# Patient Record
Sex: MALE | Race: WHITE
[De-identification: names, ages, dates, MRNs, and addresses within clinical notes are randomized per-mention and may not be internally consistent; named-entity substitution may affect disease eponyms.]

---

## 2018-07-25 ENCOUNTER — HOSPITAL ENCOUNTER (OUTPATIENT)
Dept: HOSPITAL 25 - CHICATH | Age: 68
Discharge: HOME | End: 2018-07-25
Attending: RADIOLOGY
Payer: MEDICARE

## 2018-07-25 VITALS — SYSTOLIC BLOOD PRESSURE: 167 MMHG | DIASTOLIC BLOOD PRESSURE: 77 MMHG

## 2018-07-25 DIAGNOSIS — I70.223: Primary | ICD-10-CM

## 2018-07-25 DIAGNOSIS — Z79.4: ICD-10-CM

## 2018-07-25 DIAGNOSIS — E66.9: ICD-10-CM

## 2018-07-25 DIAGNOSIS — I25.10: ICD-10-CM

## 2018-07-25 DIAGNOSIS — E11.9: ICD-10-CM

## 2018-07-25 DIAGNOSIS — Z72.0: ICD-10-CM

## 2018-07-25 DIAGNOSIS — K21.9: ICD-10-CM

## 2018-07-25 DIAGNOSIS — I25.2: ICD-10-CM

## 2018-07-25 PROCEDURE — C1894 INTRO/SHEATH, NON-LASER: HCPCS

## 2018-07-25 PROCEDURE — 75736 ARTERY X-RAYS PELVIS: CPT

## 2018-07-25 PROCEDURE — 76937 US GUIDE VASCULAR ACCESS: CPT

## 2018-07-25 PROCEDURE — C1769 GUIDE WIRE: HCPCS

## 2018-07-25 PROCEDURE — C1760 CLOSURE DEV, VASC: HCPCS

## 2018-07-25 PROCEDURE — 85347 COAGULATION TIME ACTIVATED: CPT

## 2018-07-25 PROCEDURE — C1887 CATHETER, GUIDING: HCPCS

## 2018-07-25 PROCEDURE — 99156 MOD SED OTH PHYS/QHP 5/>YRS: CPT

## 2018-07-25 PROCEDURE — 99157 MOD SED OTHER PHYS/QHP EA: CPT

## 2018-07-25 PROCEDURE — C1725 CATH, TRANSLUMIN NON-LASER: HCPCS

## 2018-07-25 NOTE — RAD
CPT II Codes: 



Procedure(s) performed:

1. Diagnostic arteriography of the pelvis and bilateral lower extremities.

2. Balloon angioplasty of the right common iliac artery, left internal iliac artery and

left superficial femoral artery.

3. Percutaneous closure with a minx closure device to the right common femoral

arteriotomy.



Date of service: July 25, 2018



Indication for procedure: Right worse than left hip and buttock claudication as well as

bilateral calf claudication



Comparison: CTA with runoff dated May 30, 2018



Contrast: 175 mL of Visipaque 320



Fluoroscopy Time: 12.7 minutes



Vessels Accessed: 

Percutaneous access was obtained with ultrasound guidance in the right common femoral

artery in the retrograde direction towards the heart. 

Catheter arteriography, with the catheter tip located within the lumen of the following

arteries, was performed at the right external iliac artery, aorta, left common iliac

artery, left common femoral artery and left superficial femoral artery.



Anesthesia: Conscious sedation with IV Fentanyl and Versed as well as local 1% lidocaine

injected locally at the arteriotomy site.



Conscious sedation time:

Timeout: 1224 hours

Case end: 1402 hours

Total conscious sedation time: 1 hour and 38 minutes



Additional medications:

*  IV heparin 5000 Units to achieve a goal ACT of 250-300.

*  The patient received 1 mg of p.o. Ativan prior to the onset of the procedure.



PROCEDURE NOTE AND INTRAPROCEDURAL IMAGING FINDINGS:



Immediately prior to the procedure the patient signed consent after thoroughly discussing

all risks, benefits and alternative therapies. The patient was positioned on the

fluoroscopy table in the supine position and the bilateral groins were shaved, prepped and

the patient was draped in

standard sterile fashion.



Using fluoroscopic imaging the location of the right common femoral head was marked

externally with a skin marker on the patient's groin. Utilizing sonographic guidance and

palpation, the right  common femoral artery was cannulated overlying the femoral head with

an 18-gauge needle. An ultrasound image was saved.



A 0.035" wire was slowly and smoothly advanced into the common femoral artery under

fluoroscopic imaging. No buckling of the wire was visualized to indicate dissection. With

the wire securing percutaneous arterial access, the needle was removed and a 5-Czech

SideArm access sheath was advanced under fluoroscopic control into the right external

iliac artery securing access.



Through the side arm of the access sheath arteriography of the right common femoral artery

demonstrated an appropriate puncture of the common femoral artery above the bifurcation

and below the inferior epigastric artery.



Although there is a prior CT angiogram with runoff, coarse calcification prevents more

reliable determination of the degree of stenoses identified. Due to the patient's clinical

presentation contrast catheter arteriography is necessary.



A wire is advanced into the aorta and a 5-Czech multiside hole flush catheter was

advanced to the level of the lower aorta. Power injector aortography was performed

demonstrating long segment narrowing of the proximal right common iliac artery. There is

apparent narrowing at the origin of the left internal iliac artery as well as narrowing of

the branches of the internal iliac arteries bilaterally. The iliac arteries are otherwise

adequately patent with flow seen into the proximal superficial femoral arteries

bilaterally.



Pressure transduction across the stenotic right common iliac artery was indicated. The

following pressures were simultaneously acquired (mm Hg):



                            SBP          DBP          Mean

Aorta:                  151            61               94  

REIA:                  133            61               88  



These pressures were confirmed with a "pulled back" technique comparing pressures acquired

in the multiside hole catheter at the lower aorta versus the right external iliac artery.

The following pressures were simultaneously acquired (mm Hg):



                            SBP          DBP          Mean

Aorta:                  164             68             104  

REIA:                  147             68              98  



The Bentson wire was reinserted and replaced with a C1 C5 Czech catheter. Utilizing the

Bentson wire and C1 catheter access across the iliac bifurcation was obtained and the wire

was advanced into the proximal left superficial femoral artery. The 5-Czech C1 catheter

was advanced to the level of the left common femoral artery.



Contrast arteriography was performed demonstrating patency of the left common femoral

artery and patency of the femoral profundus. There is mild stenosis at the proximal left

superficial femoral artery. Arteriography was performed of the remaining left superficial

femoral artery and popliteal artery demonstrating mild stenoses at the mid-level

superficial femoral artery and adequate patency of the left popliteal artery.



A hydrophilic stiff 260 cm length wire was advanced into the infrapopliteal arteries and

over the wire a 4-Czech hydrophilic catheter was advanced until the tip was in the distal

left superficial femoral artery. Contrast arteriography was performed of the remaining

popliteal artery and infrapopliteal arteries. This demonstrates a high branch point of the

left anterior tibial artery at the level of the knee joint. There is adequate patency of

the infrapopliteal arteries proximally. Arteriography as far as the forefoot shows

two-vessel in-line runoff provided by the RADHA and PTA with communication of the "pedal

loop".



With the long hydrophilic wire still securing access, the catheter was removed and an 8 mm

x 60 mm Biotronik Passeo-35 balloon was advanced to the stenotic right common iliac artery

and balloon angioplasty was performed. The balloon was inflated to just below burst

pressure corresponding to an approximate diameter measurement of 8.3 mm. The balloon

remained inflated for minimum of 3 minutes to address spasm.



Contrast arteriography through the side arm of the right common femoral artery access

sheath showed no acute dissection.



Injecting the short access sheath with the tip in the right external iliac artery

arteriogram was performed of the right superficial femoral and popliteal arteries

demonstrating mild stenoses at the mid-level right superficial femoral artery.



The short access sheath was removed over the wire and a 45 cm length, 7-Czech access

sheath was advanced over the wire. The tip was advanced to just above the junction of the

right internal/external iliac arteries to better evaluate the recently ballooned

angioplastied right common iliac artery. Contrast arteriography shows improved patency and

brisk flow through the right common iliac artery. The stiffener was reinserted and the

access sheath was advanced until the tip was in the left common femoral artery. Repeat

arteriography from this point shows multifocal stenoses in the left superficial femoral

artery.



Over the wire a 5 mm x 100 mm EverCross balloon was advanced and balloon angioplasty was

performed at the foci of stenoses in the left superficial femoral artery. During each

inflation the balloon was inflated to just below burst pressure corresponding to a

diameter measurement of 5.3 mm. The balloon remained inflated for minimum of 3 minutes to

address spasm.



Contrast arteriogram through the sheath with the tip in the left common femoral artery

showed improved patency through the left superficial femoral artery.



Over the wire the access sheath was drawn back until the tip was in the left common

femoral artery. Contrast arteriogram was performed to better characterize the stenosis at

the left internal iliac artery. There is coarse calcification causing a moderate degree of

stenosis. Utilizing the hydrophilic wire the left internal iliac artery was selected and

the 5 mm x 100 mm EverCross balloon was advanced across the stenotic ostium of the left

internal iliac artery and balloon angioplasty was performed. The balloon was inflated just

below burst pressure corresponding to a diameter measurement of 5.3 mm and remain inflated

for minimum of 3 minutes.



Contrast arteriogram after the balloon was deflated and removed demonstrated no dissection

or extravasation.



Over the wire the access sheath was exchanged the 7-Czech, 11 cm length access sheath

intended specifically for percutaneous arterial closure.



Through the side arm of the access sheath arteriography of the right common femoral artery

demonstrated an appropriate puncture of the common femoral artery above the bifurcation

and below the inferior epigastric artery.



After an appropriate resterilization of the arteriotomy and exchange for new sterile

gloves, a Minx closure device was deployed at the common femoral arteriotomy and pressure

held for approximately 15 minutes.



There were no signs of bleeding at the percutaneous arterial access site and the site was

dressed with sterile gauze and Tegaderm.



The patient tolerated the procedure well and was transferred to angiography holding bay

for standard post procedural observation.



SUMMARY OF PROCEDURE, IMAGING FINDINGS AND INTERVENTIONS PERFORMED:



1. Diagnostic studies performed:

*  Arterial access was obtained at the right common femoral artery  in the retrograde

direction (i.e. towards the heart) with ultrasound guidance. A sonographic image was

recorded.

*  Diagnostic catheter angiography was necessary as calcified atherosclerosis limited the

diagnostic utility on the previously acquired CT angiogram.

*  Diagnostic catheter angiography was performed with the catheter tip in the right

external iliac artery, aorta, left common iliac artery, left common femoral artery and

left superficial femoral artery.

*  Catheter arteriography was performed of the lower abdominal aorta and entire bilateral

iliac arterial circuit, the entire left lower extremity arterial circuit as far as the

forefoot and the left lower extremity arterial circuit including the femoral profundus,

superficial femoral artery and popliteal artery.

*  At the conclusion of the procedure arteriography was performed through the side arm of

the access sheath to image the distal right external iliac artery, right common femoral

artery and proximal superficial femoral artery and femoral profundus.



2. Interpretation of diagnostic studies performed:

*  Moderate stenosis at the right common iliac artery with a borderline mean arterial

gradient of 6 mm Hg above and below the stenosis.

*  Multifocal stenoses in the bilateral superficial femoral arteries.

*  Mild to moderate stenosis at the origin of the left internal iliac artery.

*  Arteriography performed for the purpose of deploying a percutaneous arterial closure

device demonstrates adequately patent right external iliac artery, common femoral artery

and proximal superficial femoral artery and femoral profundus.



3. Surgical interventions performed:

*  Balloon angioplasty of the right common iliac artery utilizing an 8 mm x 60 mm

Biotronik Passeo-35 balloon. 

*  Balloon angioplasty of the left superficial femoral artery and left internal iliac

artery with a 5 mm x 100 mm EverCross  balloon.

*  Closure of the right common femoral artery was achieved with a Minx closure device

followed by 15 minutes of gentle manual pressure.



4. Interpretation of interventions performed:

*  Final arteriography demonstrated improved patency and brisk flow through the

aforementioned arteries status post angioplasty.



Plan:



1. Aspirin 81 mg p.o. daily for life.

2. Plavix 75 mg p.o. daily x 6 months.

3. Clinical and imaging follow-up according to standard Interventional Radiology protocol.

## 2019-09-06 ENCOUNTER — HOSPITAL ENCOUNTER (OUTPATIENT)
Dept: HOSPITAL 25 - CHICATH | Age: 69
Discharge: HOME | End: 2019-09-06
Attending: RADIOLOGY
Payer: MEDICARE

## 2019-09-06 VITALS — DIASTOLIC BLOOD PRESSURE: 58 MMHG | SYSTOLIC BLOOD PRESSURE: 150 MMHG

## 2019-09-06 DIAGNOSIS — I70.221: Primary | ICD-10-CM

## 2019-09-06 DIAGNOSIS — Z79.84: ICD-10-CM

## 2019-09-06 DIAGNOSIS — Z96.41: ICD-10-CM

## 2019-09-06 DIAGNOSIS — F17.210: ICD-10-CM

## 2019-09-06 DIAGNOSIS — Z95.5: ICD-10-CM

## 2019-09-06 DIAGNOSIS — K21.0: ICD-10-CM

## 2019-09-06 DIAGNOSIS — Z79.4: ICD-10-CM

## 2019-09-06 DIAGNOSIS — E11.9: ICD-10-CM

## 2019-09-06 DIAGNOSIS — K58.9: ICD-10-CM

## 2019-09-06 DIAGNOSIS — Z79.01: ICD-10-CM

## 2019-09-06 DIAGNOSIS — I25.10: ICD-10-CM

## 2019-09-06 DIAGNOSIS — E66.9: ICD-10-CM

## 2019-09-06 DIAGNOSIS — K22.70: ICD-10-CM

## 2019-09-06 LAB
APTT PPP: 33.1 SECONDS (ref 26–38)
INR PPP/BLD: 0.97 (ref 0.82–1.09)

## 2019-09-06 PROCEDURE — C1887 CATHETER, GUIDING: HCPCS

## 2019-09-06 PROCEDURE — 75736 ARTERY X-RAYS PELVIS: CPT

## 2019-09-06 PROCEDURE — 85610 PROTHROMBIN TIME: CPT

## 2019-09-06 PROCEDURE — 99157 MOD SED OTHER PHYS/QHP EA: CPT

## 2019-09-06 PROCEDURE — C1724 CATH, TRANS ATHEREC,ROTATION: HCPCS

## 2019-09-06 PROCEDURE — C1894 INTRO/SHEATH, NON-LASER: HCPCS

## 2019-09-06 PROCEDURE — C1753 CATH, INTRAVAS ULTRASOUND: HCPCS

## 2019-09-06 PROCEDURE — C1769 GUIDE WIRE: HCPCS

## 2019-09-06 PROCEDURE — 85347 COAGULATION TIME ACTIVATED: CPT

## 2019-09-06 PROCEDURE — C1760 CLOSURE DEV, VASC: HCPCS

## 2019-09-06 PROCEDURE — 37252 INTRVASC US NONCORONARY 1ST: CPT

## 2019-09-06 PROCEDURE — C2623 CATH, TRANSLUMIN, DRUG-COAT: HCPCS

## 2019-09-06 PROCEDURE — 75716 ARTERY X-RAYS ARMS/LEGS: CPT

## 2019-09-06 PROCEDURE — 76937 US GUIDE VASCULAR ACCESS: CPT

## 2019-09-06 PROCEDURE — 99156 MOD SED OTH PHYS/QHP 5/>YRS: CPT

## 2019-09-06 PROCEDURE — 85730 THROMBOPLASTIN TIME PARTIAL: CPT

## 2019-09-06 PROCEDURE — 36415 COLL VENOUS BLD VENIPUNCTURE: CPT
